# Patient Record
Sex: MALE | Race: WHITE | NOT HISPANIC OR LATINO | URBAN - METROPOLITAN AREA
[De-identification: names, ages, dates, MRNs, and addresses within clinical notes are randomized per-mention and may not be internally consistent; named-entity substitution may affect disease eponyms.]

---

## 2018-02-21 ENCOUNTER — TELEMEDICINE2 (OUTPATIENT)
Dept: ENDOCRINOLOGY | Facility: MEDICAL CENTER | Age: 38
End: 2018-02-21
Payer: COMMERCIAL

## 2018-02-21 VITALS
OXYGEN SATURATION: 97 % | WEIGHT: 201 LBS | SYSTOLIC BLOOD PRESSURE: 109 MMHG | HEIGHT: 70 IN | DIASTOLIC BLOOD PRESSURE: 71 MMHG | HEART RATE: 78 BPM | BODY MASS INDEX: 28.77 KG/M2

## 2018-02-21 DIAGNOSIS — E78.5 HYPERLIPIDEMIA, UNSPECIFIED HYPERLIPIDEMIA TYPE: ICD-10-CM

## 2018-02-21 DIAGNOSIS — E55.9 VITAMIN D DEFICIENCY: ICD-10-CM

## 2018-02-21 DIAGNOSIS — E53.8 VITAMIN B12 DEFICIENCY: ICD-10-CM

## 2018-02-21 PROCEDURE — 99204 OFFICE O/P NEW MOD 45 MIN: CPT | Mod: GT | Performed by: INTERNAL MEDICINE

## 2018-02-21 RX ORDER — GABAPENTIN 800 MG/1
800 TABLET ORAL 3 TIMES DAILY
COMMUNITY

## 2018-02-21 RX ORDER — LISINOPRIL 5 MG/1
5 TABLET ORAL DAILY
COMMUNITY

## 2018-02-21 RX ORDER — AMITRIPTYLINE HYDROCHLORIDE 50 MG/1
50 TABLET, FILM COATED ORAL NIGHTLY
COMMUNITY

## 2018-02-21 RX ORDER — INSULIN GLARGINE 100 [IU]/ML
INJECTION, SOLUTION SUBCUTANEOUS NIGHTLY
COMMUNITY

## 2018-02-21 RX ORDER — ATORVASTATIN CALCIUM 20 MG/1
20 TABLET, FILM COATED ORAL NIGHTLY
COMMUNITY

## 2018-02-21 NOTE — PROGRESS NOTES
New Patient Consult Note  Primary care physician: No primary care provider on file.    Reason for consult: Management of Uncontrolled Type 1 Diabetes.  It is not clear whether he has type one diabetes vs type 2 diabetes versus type I-1/2 diabetes. He was diagnosed with diabetes 11 years ago. For the first 3 years of his diagnosis his BLOOD sugars were controlled on metformin, glipizide and Actos as per the patient. He has been on basal bolus regimen for the last 8 years or so.    HPI:  Saturnino Lock is a 37 y.o. old patient who comes in today for evaluation of above stated problem.    Most Recent HbA1c: 9.4% as per the correctional facility and it was done on 01/05/2018    Current Diabetes Regimen:  Basal Insulin: Lantus(Insulin glargine)  30 units sc once daily   Prandial Insulin: Regular insulin : 2 units for 50 points greater than 150 mg/dl of blood sugars.      Before Breakfast:107-237 for most part; on one occasion it was 440 when he ate burrito the night before.  Before Lunch: 329  Before Dinner:239,206  Before Bedtime: 237  Hypoglycemia:  None      ROS:  Constitutional: No weight loss  Cardiac: No palpitations or racing heart  Resp: No shortness of breath  Neuro: No numbness or tinging in feet  Endo: No heat or cold intolerance, no polyuria or polydipsia  All other systems were reviewed and were negative.    Past Medical History:  There are no active problems to display for this patient.      Past Surgical History:  Past Surgical History:   Procedure Laterality Date   • ACHILLES TENDON REPAIR Bilateral 06/08/2006       Allergies:  Patient has no known allergies.    Social History:  Social History     Social History   • Marital status:      Spouse name: N/A   • Number of children: N/A   • Years of education: N/A     Occupational History   • Not on file.     Social History Main Topics   • Smoking status: Former Smoker     Packs/day: 1.00     Years: 10.00   • Smokeless tobacco: Never Used   • Alcohol use No  "  • Drug use: No   • Sexual activity: Not on file     Other Topics Concern   • Not on file     Social History Narrative   • No narrative on file       Family History:  Family History   Problem Relation Age of Onset   • Cancer Mother    • No Known Problems Father    • No Known Problems Maternal Grandmother    • No Known Problems Maternal Grandfather    • No Known Problems Paternal Grandmother    • No Known Problems Paternal Grandfather        Medications:    Current Outpatient Prescriptions:   •  amitriptyline (ELAVIL) 50 MG Tab, Take 50 mg by mouth every evening., Disp: , Rfl:   •  atorvastatin (LIPITOR) 20 MG Tab, Take 20 mg by mouth every evening., Disp: , Rfl:   •  Calcium Polycarbophil (FIBER-LAX PO), Take  by mouth., Disp: , Rfl:   •  gabapentin (NEURONTIN) 800 MG tablet, Take 800 mg by mouth 3 times a day., Disp: , Rfl:   •  insulin regular (HUMULIN R) 100 Unit/mL Solution, Inject  as instructed 3 times a day before meals., Disp: , Rfl:   •  insulin glargine (LANTUS) 100 UNIT/ML Solution, Inject  as instructed every evening., Disp: , Rfl:   •  lisinopril (PRINIVIL) 5 MG Tab, Take 5 mg by mouth every day., Disp: , Rfl:   •  metFORMIN (GLUCOPHAGE) 500 MG Tab, Take 1,000 mg by mouth 2 times a day, with meals., Disp: , Rfl:     Labs: Reviewed    Physical Examination:  Vital signs: /71   Pulse 78   Ht 1.778 m (5' 10\")   Wt 91.2 kg (201 lb)   SpO2 97%   BMI 28.84 kg/m²  Body mass index is 28.84 kg/m².  General: No apparent distress, cooperative  Eyes: No scleral icterus or discharge  ENMT: Normal on external inspection of nose, lips, normal thyroid exam  Neck: No abnormal masses on inspection  Resp: Normal effort, clear to auscultation bilaterally   CVS: Regular rate and rhythm, S1 S2 normal, no murmur   Extremities: No edema  Abdomen: abdominal obesity present  Neuro: Alert and oriented  Skin: No rash  Psych: Normal mood and affect, intact memory and able to make informed decisions    Assessment and " Plan:    1. Uncontrolled type 1 diabetes mellitus with diabetic polyneuropathy (CMS-HCC)  We will do the following investigations to explore the underlying etiology of his diabetes.  - C-PEPTIDE; Future  - JOJO-65; Future  - IA-2 AUTOANTIBODIES    If he has detectable or positive C-peptide then he most likely has type 2 diabetes in which case I will consider adding SGLT 2 inhibitor and GLP-1 therapy if there is access and availability of this agents at the correctional facility.    In the interim I recommend increasing his Lantus to 40 units once daily. (I'm okay if it is given a once a day in the morning); I have advised him to limit his carbohydrate intake only to 30 g with breakfast, 30 g with lunch and 30 g with dinner and to add more protein, veggies and fruits for satiety and gastric fullness.    I also recommend stopping the regular insulin. I recommend either Humalog or NovoLog as the prandial insulin with meals and at bedtime as per the following sliding scale:    101-150: 2 units; 151-200: 4 units; 201-250: 6 units and so forth.......    2. Hyperlipidemia, unspecified hyperlipidemia type  Continue atorvastatin.    3. Vitamin D deficiency  Rule out vit D def.    4. Vitamin B12 deficiency  Rule out vit B12 deficiency.      Return in about 1 month (around 3/21/2018).    Thank you for allowing me to participate in the care of this patient.    David Wolfe M.D.  02/21/18    CC:   No primary care provider on file.    This note was created using voice recognition software (Dragon). The accuracy of the dictation is limited by the abilities of the software. I have reviewed the note prior to signing, however some errors in grammar and context are still possible. If you have any questions related to this note please do not hesitate to contact our office.

## 2018-06-13 ENCOUNTER — TELEMEDICINE2 (OUTPATIENT)
Dept: ENDOCRINOLOGY | Facility: MEDICAL CENTER | Age: 38
End: 2018-06-13
Payer: COMMERCIAL

## 2018-06-13 VITALS
TEMPERATURE: 97.6 F | WEIGHT: 211 LBS | SYSTOLIC BLOOD PRESSURE: 116 MMHG | DIASTOLIC BLOOD PRESSURE: 76 MMHG | RESPIRATION RATE: 16 BRPM | OXYGEN SATURATION: 95 % | HEIGHT: 70 IN | BODY MASS INDEX: 30.21 KG/M2 | HEART RATE: 81 BPM

## 2018-06-13 DIAGNOSIS — E53.8 VITAMIN B12 DEFICIENCY: ICD-10-CM

## 2018-06-13 DIAGNOSIS — E55.9 VITAMIN D DEFICIENCY: ICD-10-CM

## 2018-06-13 PROCEDURE — 99214 OFFICE O/P EST MOD 30 MIN: CPT | Performed by: INTERNAL MEDICINE

## 2018-06-13 NOTE — PROGRESS NOTES
Endocrinology Clinic Progress Note  PCP: No primary care provider on file.    HPI:  Saturnino Lock is a 38 y.o. old patient who comes in today for review of Management of Uncontrolled Type 1 Diabetes    HPI:  Saturnino Lock is a 38 y.o. old patient who comes in today for evaluation of above stated problem.    Most Recent HbA1c: No results found for: HBA1C     Current Diabetes Regimen:    Metformin : 1gram twice daily.  Basal Insulin: Lantus(Insulin glargine)  15 units twice daily.  Prandial Insulin: Humalog sliding scale starting at 100 mg/dl and above. sc with  breakfast lunch dinner     Before Breakfast: 200's  Before Lunch: 's  Before Dinner:   Before bedtime: not checking  Hypoglycemia:  Occasional    ROS:  Constitutional: No weight loss  Cardiac: No palpitations or racing heart  Resp: No shortness of breath  Neuro: No numbness or tinging in feet  Endo: No heat or cold intolerance, no polyuria or polydipsia  All other systems were reviewed and were negative.    Past Medical History:  There are no active problems to display for this patient.      Past Surgical History:  Past Surgical History:   Procedure Laterality Date   • ACHILLES TENDON REPAIR Bilateral 06/08/2006       Allergies:  Patient has no known allergies.    Social History:  Social History     Social History   • Marital status:      Spouse name: N/A   • Number of children: N/A   • Years of education: N/A     Occupational History   • Not on file.     Social History Main Topics   • Smoking status: Former Smoker     Packs/day: 1.00     Years: 10.00   • Smokeless tobacco: Never Used   • Alcohol use No   • Drug use: No   • Sexual activity: Not on file     Other Topics Concern   • Not on file     Social History Narrative   • No narrative on file       Family History:  Family History   Problem Relation Age of Onset   • Cancer Mother    • No Known Problems Father    • No Known Problems Maternal Grandmother    • No Known Problems Maternal Grandfather  "   • No Known Problems Paternal Grandmother    • No Known Problems Paternal Grandfather        Medications:    Current Outpatient Prescriptions:   •  amitriptyline (ELAVIL) 50 MG Tab, Take 50 mg by mouth every evening., Disp: , Rfl:   •  atorvastatin (LIPITOR) 20 MG Tab, Take 20 mg by mouth every evening., Disp: , Rfl:   •  Calcium Polycarbophil (FIBER-LAX PO), Take  by mouth., Disp: , Rfl:   •  gabapentin (NEURONTIN) 800 MG tablet, Take 800 mg by mouth 3 times a day., Disp: , Rfl:   •  insulin regular (HUMULIN R) 100 Unit/mL Solution, Inject  as instructed 3 times a day before meals., Disp: , Rfl:   •  insulin glargine (LANTUS) 100 UNIT/ML Solution, Inject  as instructed every evening., Disp: , Rfl:   •  lisinopril (PRINIVIL) 5 MG Tab, Take 5 mg by mouth every day., Disp: , Rfl:   •  metFORMIN (GLUCOPHAGE) 500 MG Tab, Take 1,000 mg by mouth 2 times a day, with meals., Disp: , Rfl:     Labs: Reviewed    Physical Examination:  Vital signs: /76   Pulse 81   Temp 36.4 °C (97.6 °F)   Resp 16   Ht 1.778 m (5' 10\")   Wt 95.7 kg (211 lb)   SpO2 95%   BMI 30.28 kg/m²  Body mass index is 30.28 kg/m².  General: No apparent distress, cooperative  Eyes: No scleral icterus or discharge  ENMT: Normal on external inspection of nose, lips, normal thyroid exam  Neck: No abnormal masses on inspection  Resp: Normal effort, clear to auscultation bilaterally   CVS: Regular rate and rhythm, S1 S2 normal, no murmur   Extremities: No edema  Abdomen: abdominal obesity present  Neuro: Alert and oriented  Skin: No rash  Psych: Normal mood and affect, intact memory and able to make informed decisions    Assessment and Plan:    1. Uncontrolled type 1 diabetes mellitus with diabetic polyneuropathy (HCC)  Variable with wide fluctuations in blood sugars. Recommend increasing lantus to 25 units sc in the evening and continue 15 units sc in the morning. Recommend Humalo: 10 grams of carb with 1 unit for 50 above 150 mg/dl as " correctional dose.    He does have type 1 diabetes as his c-peptide is undetectable.    2. Vitamin D deficiency  Ruled out.    3. Vitamin B12 deficiency  Ruled out.    This consultation was conducted utilizing secure and encrypted videoconferencing equipment with the assistance of a trained tele-presenter at the originating site.    Return in about 1 month (around 7/13/2018).    Thank you for allowing me to participate in the care of this patient.    David Wolfe M.D.  06/13/18    CC:   No primary care provider on file.    This note was created using voice recognition software (Dragon). The accuracy of the dictation is limited by the abilities of the software. I have reviewed the note prior to signing, however some errors in grammar and context are still possible. If you have any questions related to this note please do not hesitate to contact our office.

## 2020-04-08 ENCOUNTER — APPOINTMENT (RX ONLY)
Dept: URBAN - METROPOLITAN AREA CLINIC 2 | Facility: CLINIC | Age: 40
Setting detail: DERMATOLOGY
End: 2020-04-08

## 2020-04-08 DIAGNOSIS — D485 NEOPLASM OF UNCERTAIN BEHAVIOR OF SKIN: ICD-10-CM

## 2020-04-08 DIAGNOSIS — L82.1 OTHER SEBORRHEIC KERATOSIS: ICD-10-CM

## 2020-04-08 DIAGNOSIS — D18.0 HEMANGIOMA: ICD-10-CM

## 2020-04-08 DIAGNOSIS — L81.4 OTHER MELANIN HYPERPIGMENTATION: ICD-10-CM

## 2020-04-08 PROBLEM — D48.5 NEOPLASM OF UNCERTAIN BEHAVIOR OF SKIN: Status: ACTIVE | Noted: 2020-04-08

## 2020-04-08 PROBLEM — D18.01 HEMANGIOMA OF SKIN AND SUBCUTANEOUS TISSUE: Status: ACTIVE | Noted: 2020-04-08

## 2020-04-08 PROCEDURE — 99243 OFF/OP CNSLTJ NEW/EST LOW 30: CPT

## 2020-04-08 PROCEDURE — ? DEFER

## 2020-04-08 PROCEDURE — ? COUNSELING

## 2020-04-08 ASSESSMENT — LOCATION SIMPLE DESCRIPTION DERM
LOCATION SIMPLE: LEFT EYEBROW
LOCATION SIMPLE: RIGHT FOREARM
LOCATION SIMPLE: LEFT UPPER BACK
LOCATION SIMPLE: LEFT FOREARM
LOCATION SIMPLE: LEFT UPPER ARM

## 2020-04-08 ASSESSMENT — LOCATION DETAILED DESCRIPTION DERM
LOCATION DETAILED: RIGHT PROXIMAL RADIAL DORSAL FOREARM
LOCATION DETAILED: LEFT CENTRAL EYEBROW
LOCATION DETAILED: LEFT ANTERIOR DISTAL UPPER ARM
LOCATION DETAILED: LEFT INFERIOR UPPER BACK
LOCATION DETAILED: LEFT PROXIMAL DORSAL FOREARM

## 2020-04-08 ASSESSMENT — LOCATION ZONE DERM
LOCATION ZONE: ARM
LOCATION ZONE: TRUNK
LOCATION ZONE: FACE

## 2020-04-08 NOTE — PROCEDURE: DEFER
Introduction Text (Please End With A Colon): The following procedure was deferred:
Detail Level: Detailed
Procedure To Be Performed At Next Visit: Biopsy by shave method
Instructions (Optional): Left eyebrow 0.3x0.3cm
Instructions (Optional): Left upper back 0.2x0.2cm\\nSuperior left nipple 0.1x0.1cm

## 2020-04-08 NOTE — PROCEDURE: MIPS QUALITY
Quality 226: Preventive Care And Screening: Tobacco Use: Screening And Cessation Intervention: Tobacco Screening not Performed for Unknown Reasons
Quality 130: Documentation Of Current Medications In The Medical Record: Current Medications Documented
Detail Level: Detailed
Quality 431: Preventive Care And Screening: Unhealthy Alcohol Use - Screening: Unhealthy alcohol use screening not performed, reason not otherwise specified
Quality 110: Preventive Care And Screening: Influenza Immunization: Influenza immunization was not ordered or administered, reason not given